# Patient Record
Sex: FEMALE | URBAN - METROPOLITAN AREA
[De-identification: names, ages, dates, MRNs, and addresses within clinical notes are randomized per-mention and may not be internally consistent; named-entity substitution may affect disease eponyms.]

---

## 2023-12-08 ENCOUNTER — NURSING HOME VISIT (OUTPATIENT)
Dept: POST ACUTE CARE | Facility: EXTERNAL LOCATION | Age: 88
End: 2023-12-08
Payer: MEDICARE

## 2023-12-08 DIAGNOSIS — E78.5 HYPERLIPIDEMIA, UNSPECIFIED HYPERLIPIDEMIA TYPE: ICD-10-CM

## 2023-12-08 DIAGNOSIS — E53.8 B12 DEFICIENCY: ICD-10-CM

## 2023-12-08 DIAGNOSIS — I10 PRIMARY HYPERTENSION: Primary | ICD-10-CM

## 2023-12-08 DIAGNOSIS — R52 PAIN: ICD-10-CM

## 2023-12-08 PROCEDURE — 99348 HOME/RES VST EST LOW MDM 30: CPT | Performed by: NURSE PRACTITIONER

## 2023-12-08 NOTE — LETTER
Patient: Aisha Harrison  : 3/26/1926    Encounter Date: 2023    Patient ID: Aisha Harrison is a 89 y.o. female who is assisted living/ home patient being seen and evaluated for multiple medical problems.  [id number]   3/26/1926    Pulse 79   Resp 15   Wt 90.3 kg (199 lb)     Assessment/Plan  Problem List Items Addressed This Visit       Pain     Hands- arthritis  Acetaminophen 650 mg by mouth simeon 8 hrs as needed         B12 deficiency     B12 1000 mcg by mouth daily   2023 B12- 716  B12 level and CBC with diff every 6 months          Primary hypertension - Primary     Amlodipine 5 mg by mouth daily   2023 BUN/Creat 17/0.77, Potassium 4.0  BMP every 6 months          Hyperlipidemia     Atorvastatin 10 mg by mouth every HS   Lipid panel every 6 months               HPI: Client resides at [facility]. PMH includes HTN, HLD, B12 deficiency, Thyroid nodules, Arthritis, Hysterectomy, and Dizziness. Client denies HA, dizziness, or lightheadedness. Denies CP, SOB, Cough, or increased edema. RA. Denies abdominal pain, N/V, diarrhea, or constipation. Denies dysuria. Denies change in appetite. Denies insomnia. C/O bilateral hand pain that she attributes to arthritis.     Review of Systems ROS as described in in HPI     Physical Exam  Constitutional:       Comments: Sitting in chair    HENT:      Mouth/Throat:      Mouth: Mucous membranes are moist.   Cardiovascular:      Rate and Rhythm: Normal rate.   Pulmonary:      Effort: Pulmonary effort is normal.      Comments: RA  Abdominal:      General: Bowel sounds are normal.   Genitourinary:     Comments: Denies dysuria   Musculoskeletal:      Cervical back: Neck supple.      Right lower leg: Right lower leg edema: plus one.      Left lower leg: Left lower leg edema: plus one.   Skin:     General: Skin is warm and dry.   Neurological:      Mental Status: She is alert. Mental status is at baseline.   Psychiatric:         Mood and Affect: Mood  normal.      Comments: Conversational   Pleasant                    Electronically Signed By: HELLEN Beckham   12/9/23  6:55 AM

## 2023-12-09 VITALS — HEART RATE: 79 BPM | WEIGHT: 199 LBS | RESPIRATION RATE: 15 BRPM

## 2023-12-09 PROBLEM — E78.5 HYPERLIPIDEMIA: Status: ACTIVE | Noted: 2023-12-09

## 2023-12-09 PROBLEM — R52 PAIN: Status: ACTIVE | Noted: 2023-12-09

## 2023-12-09 PROBLEM — E53.8 B12 DEFICIENCY: Status: ACTIVE | Noted: 2023-12-09

## 2023-12-09 PROBLEM — I10 PRIMARY HYPERTENSION: Status: ACTIVE | Noted: 2023-12-09

## 2023-12-09 NOTE — PROGRESS NOTES
Patient ID: Aisha Harrison is a 89 y.o. female who is assisted living/ home patient being seen and evaluated for multiple medical problems.  [id number]   3/26/1926    Pulse 79   Resp 15   Wt 90.3 kg (199 lb)     Assessment/Plan  Problem List Items Addressed This Visit       Pain     Hands- arthritis  Acetaminophen 650 mg by mouth simeon 8 hrs as needed         B12 deficiency     B12 1000 mcg by mouth daily   5/31/2023 B12- 716  B12 level and CBC with diff every 6 months          Primary hypertension - Primary     Amlodipine 5 mg by mouth daily   5/31/2023 BUN/Creat 17/0.77, Potassium 4.0  BMP every 6 months          Hyperlipidemia     Atorvastatin 10 mg by mouth every HS   Lipid panel every 6 months               HPI: Client resides at [facility]. PMH includes HTN, HLD, B12 deficiency, Thyroid nodules, Arthritis, Hysterectomy, and Dizziness. Client denies HA, dizziness, or lightheadedness. Denies CP, SOB, Cough, or increased edema. RA. Denies abdominal pain, N/V, diarrhea, or constipation. Denies dysuria. Denies change in appetite. Denies insomnia. C/O bilateral hand pain that she attributes to arthritis.     Review of Systems ROS as described in in HPI     Physical Exam  Constitutional:       Comments: Sitting in chair    HENT:      Mouth/Throat:      Mouth: Mucous membranes are moist.   Cardiovascular:      Rate and Rhythm: Normal rate.   Pulmonary:      Effort: Pulmonary effort is normal.      Comments: RA  Abdominal:      General: Bowel sounds are normal.   Genitourinary:     Comments: Denies dysuria   Musculoskeletal:      Cervical back: Neck supple.      Right lower leg: Right lower leg edema: plus one.      Left lower leg: Left lower leg edema: plus one.   Skin:     General: Skin is warm and dry.   Neurological:      Mental Status: She is alert. Mental status is at baseline.   Psychiatric:         Mood and Affect: Mood normal.      Comments: Conversational   Pleasant

## 2024-01-07 ENCOUNTER — NURSING HOME VISIT (OUTPATIENT)
Dept: POST ACUTE CARE | Facility: EXTERNAL LOCATION | Age: 89
End: 2024-01-07
Payer: MEDICARE

## 2024-01-07 DIAGNOSIS — E78.5 HYPERLIPIDEMIA, UNSPECIFIED HYPERLIPIDEMIA TYPE: ICD-10-CM

## 2024-01-07 DIAGNOSIS — R41.3 AMNESIA: Primary | ICD-10-CM

## 2024-01-07 DIAGNOSIS — Z91.81 AT RISK FOR FALLS: ICD-10-CM

## 2024-01-07 DIAGNOSIS — I10 PRIMARY HYPERTENSION: ICD-10-CM

## 2024-01-07 DIAGNOSIS — R53.1 WEAKNESS: ICD-10-CM

## 2024-01-07 DIAGNOSIS — M19.90 OSTEOARTHRITIS, UNSPECIFIED OSTEOARTHRITIS TYPE, UNSPECIFIED SITE: ICD-10-CM

## 2024-01-07 DIAGNOSIS — E53.8 B12 DEFICIENCY: ICD-10-CM

## 2024-01-07 PROCEDURE — 99308 SBSQ NF CARE LOW MDM 20: CPT | Performed by: INTERNAL MEDICINE

## 2024-01-07 NOTE — LETTER
Patient: Aisha Harrison  : 3/26/1926    Encounter Date: 2024    PLACE OF SERVICE:  [facility].    This is a subsequent visit.    Subjective  Patient ID: Aisha Harrison is a 89 y.o. female who presents for Follow-up.    Aisha Harrison is a 89-year-old female with history of amnesia.  She suffers from hypertension as well as osteoarthritis.  She is unable to care for herself and requires supportive care.    ALLERGIES:  Prednisone.    Review of Systems   Constitutional:  Negative for chills and fever.   Cardiovascular:  Negative for chest pain.   All other systems reviewed and are negative.    Objective  /80   Pulse 78   Temp 36.5 °C (97.7 °F)   Resp 16     Physical Exam  Vitals reviewed.   Constitutional:       General: She is not in acute distress.     Comments: This is a well-developed, well-nourished female, sitting in a chair.   HENT:      Right Ear: Tympanic membrane, ear canal and external ear normal.      Left Ear: Tympanic membrane, ear canal and external ear normal.   Eyes:      General: No scleral icterus.     Pupils: Pupils are equal, round, and reactive to light.   Neck:      Vascular: No carotid bruit.   Cardiovascular:      Heart sounds: Normal heart sounds, S1 normal and S2 normal. No murmur heard.     No friction rub.   Pulmonary:      Effort: Pulmonary effort is normal.      Breath sounds: Normal breath sounds and air entry.   Abdominal:      Palpations: There is no hepatomegaly, splenomegaly or mass.   Musculoskeletal:         General: No swelling or deformity. Normal range of motion.      Cervical back: Neck supple.      Right lower leg: No edema.      Left lower leg: No edema.   Lymphadenopathy:      Cervical: No cervical adenopathy.      Upper Body:      Right upper body: No axillary adenopathy.      Left upper body: No axillary adenopathy.      Lower Body: No right inguinal adenopathy. No left inguinal adenopathy.   Neurological:      Mental Status: She is alert.       Cranial Nerves: Cranial nerves 2-12 are intact. No cranial nerve deficit.      Sensory: No sensory deficit.      Motor: Motor function is intact. No weakness.      Gait: Gait is intact.      Deep Tendon Reflexes: Reflexes normal.      Comments: The patient is oriented x2.   Psychiatric:         Mood and Affect: Mood normal. Mood is not anxious or depressed. Affect is not angry.         Behavior: Behavior is not agitated.         Thought Content: Thought content normal.         Judgment: Judgment normal.     LAB WORK:  Laboratory studies reviewed.    Assessment/Plan  Problem List Items Addressed This Visit             ICD-10-CM       Cardiac and Vasculature    Primary hypertension I10    Hyperlipidemia E78.5       Endocrine/Metabolic    B12 deficiency E53.8     Other Visit Diagnoses         Codes    Amnesia    -  Primary R41.3    Osteoarthritis, unspecified osteoarthritis type, unspecified site     M19.90    Weakness     R53.1    At risk for falls     Z91.81        1. Amnesia, supportive care.  2. Osteoarthritis, on Tylenol.  3. Hypertension, med controlled.  4. Hyperlipidemia, on atorvastatin.  5. Vitamin B12 deficiency, on supplementation.  6. Weakness, on PT and OT.  7. Fall risk, fall precaution.    Scribe Attestation  By signing my name below, IDiana, Devi attest that this documentation has been prepared under the direction and in the presence of Nico Blackwell MD.       Electronically Signed By: Nico Blackwell MD   1/16/24  2:14 PM

## 2024-01-16 VITALS
DIASTOLIC BLOOD PRESSURE: 80 MMHG | HEART RATE: 78 BPM | SYSTOLIC BLOOD PRESSURE: 124 MMHG | TEMPERATURE: 97.7 F | RESPIRATION RATE: 16 BRPM

## 2024-01-16 ASSESSMENT — ENCOUNTER SYMPTOMS
CHILLS: 0
FEVER: 0

## 2024-01-16 NOTE — PROGRESS NOTES
PLACE OF SERVICE:  [facility].    This is a subsequent visit.    Subjective   Patient ID: Aisha Harrison is a 89 y.o. female who presents for Follow-up.    Aisha Harrison is a 89-year-old female with history of amnesia.  She suffers from hypertension as well as osteoarthritis.  She is unable to care for herself and requires supportive care.    ALLERGIES:  Prednisone.    Review of Systems   Constitutional:  Negative for chills and fever.   Cardiovascular:  Negative for chest pain.   All other systems reviewed and are negative.    Objective   /80   Pulse 78   Temp 36.5 °C (97.7 °F)   Resp 16     Physical Exam  Vitals reviewed.   Constitutional:       General: She is not in acute distress.     Comments: This is a well-developed, well-nourished female, sitting in a chair.   HENT:      Right Ear: Tympanic membrane, ear canal and external ear normal.      Left Ear: Tympanic membrane, ear canal and external ear normal.   Eyes:      General: No scleral icterus.     Pupils: Pupils are equal, round, and reactive to light.   Neck:      Vascular: No carotid bruit.   Cardiovascular:      Heart sounds: Normal heart sounds, S1 normal and S2 normal. No murmur heard.     No friction rub.   Pulmonary:      Effort: Pulmonary effort is normal.      Breath sounds: Normal breath sounds and air entry.   Abdominal:      Palpations: There is no hepatomegaly, splenomegaly or mass.   Musculoskeletal:         General: No swelling or deformity. Normal range of motion.      Cervical back: Neck supple.      Right lower leg: No edema.      Left lower leg: No edema.   Lymphadenopathy:      Cervical: No cervical adenopathy.      Upper Body:      Right upper body: No axillary adenopathy.      Left upper body: No axillary adenopathy.      Lower Body: No right inguinal adenopathy. No left inguinal adenopathy.   Neurological:      Mental Status: She is alert.      Cranial Nerves: Cranial nerves 2-12 are intact. No cranial nerve  deficit.      Sensory: No sensory deficit.      Motor: Motor function is intact. No weakness.      Gait: Gait is intact.      Deep Tendon Reflexes: Reflexes normal.      Comments: The patient is oriented x2.   Psychiatric:         Mood and Affect: Mood normal. Mood is not anxious or depressed. Affect is not angry.         Behavior: Behavior is not agitated.         Thought Content: Thought content normal.         Judgment: Judgment normal.     LAB WORK:  Laboratory studies reviewed.    Assessment/Plan   Problem List Items Addressed This Visit             ICD-10-CM       Cardiac and Vasculature    Primary hypertension I10    Hyperlipidemia E78.5       Endocrine/Metabolic    B12 deficiency E53.8     Other Visit Diagnoses         Codes    Amnesia    -  Primary R41.3    Osteoarthritis, unspecified osteoarthritis type, unspecified site     M19.90    Weakness     R53.1    At risk for falls     Z91.81        1. Amnesia, supportive care.  2. Osteoarthritis, on Tylenol.  3. Hypertension, med controlled.  4. Hyperlipidemia, on atorvastatin.  5. Vitamin B12 deficiency, on supplementation.  6. Weakness, on PT and OT.  7. Fall risk, fall precaution.    Scribe Attestation  By signing my name below, I, Diana Blackman, Scribe attest that this documentation has been prepared under the direction and in the presence of Nico Blackwell MD.

## 2024-04-01 ENCOUNTER — NURSING HOME VISIT (OUTPATIENT)
Dept: POST ACUTE CARE | Facility: EXTERNAL LOCATION | Age: 89
End: 2024-04-01
Payer: MEDICARE

## 2024-04-01 DIAGNOSIS — R53.1 WEAKNESS: ICD-10-CM

## 2024-04-01 DIAGNOSIS — R41.3 AMNESIA: Primary | ICD-10-CM

## 2024-04-01 DIAGNOSIS — I10 PRIMARY HYPERTENSION: ICD-10-CM

## 2024-04-01 DIAGNOSIS — E78.5 HYPERLIPIDEMIA, UNSPECIFIED HYPERLIPIDEMIA TYPE: ICD-10-CM

## 2024-04-01 DIAGNOSIS — M19.90 OSTEOARTHRITIS, UNSPECIFIED OSTEOARTHRITIS TYPE, UNSPECIFIED SITE: ICD-10-CM

## 2024-04-01 DIAGNOSIS — R42 DIZZINESS: ICD-10-CM

## 2024-04-01 DIAGNOSIS — Z91.81 AT RISK FOR FALLS: ICD-10-CM

## 2024-04-01 PROCEDURE — 99308 SBSQ NF CARE LOW MDM 20: CPT | Performed by: INTERNAL MEDICINE

## 2024-04-01 NOTE — LETTER
Patient: Aisha Harrison  : 3/26/1926    Encounter Date: 2024    PLACE OF SERVICE:  [facility]    This is a subsequent visit.    Subjective  Patient ID: Aisha Harrison is a 89 y.o. female who presents for Follow-up.    Aisha Harrison is a 89-year-old female who suffers from amnesia.  She has episodes of dizziness and is unable to care for herself.  She requires supportive care.    Review of Systems   Constitutional:  Negative for chills and fever.   Cardiovascular:  Negative for chest pain.   All other systems reviewed and are negative.    Objective  /82   Pulse 78   Temp 36.6 °C (97.8 °F)   Resp 18     Physical Exam  Vitals reviewed.   Constitutional:       General: She is not in acute distress.     Comments: This is a well-developed, well-nourished female, sitting in a chair   HENT:      Right Ear: Tympanic membrane, ear canal and external ear normal.      Left Ear: Tympanic membrane, ear canal and external ear normal.   Eyes:      General: No scleral icterus.     Pupils: Pupils are equal, round, and reactive to light.   Neck:      Vascular: No carotid bruit.   Cardiovascular:      Heart sounds: Normal heart sounds, S1 normal and S2 normal. No murmur heard.     No friction rub.   Pulmonary:      Effort: Pulmonary effort is normal.      Breath sounds: Normal breath sounds and air entry.   Abdominal:      Palpations: There is no hepatomegaly, splenomegaly or mass.   Musculoskeletal:         General: No swelling or deformity. Normal range of motion.      Cervical back: Neck supple.      Right lower leg: No edema.      Left lower leg: No edema.   Lymphadenopathy:      Cervical: No cervical adenopathy.      Upper Body:      Right upper body: No axillary adenopathy.      Left upper body: No axillary adenopathy.      Lower Body: No right inguinal adenopathy. No left inguinal adenopathy.   Neurological:      Cranial Nerves: Cranial nerves 2-12 are intact. No cranial nerve deficit.      Sensory: No  sensory deficit.      Motor: Motor function is intact. No weakness.      Gait: Gait is intact.      Deep Tendon Reflexes: Reflexes normal.      Comments: The patient is alert and oriented x2.   Psychiatric:         Mood and Affect: Mood normal. Mood is not anxious or depressed. Affect is not angry.         Behavior: Behavior is not agitated.         Thought Content: Thought content normal.         Judgment: Judgment normal.     LAB WORK:  Laboratory studies reviewed.    Assessment/Plan  Problem List Items Addressed This Visit             ICD-10-CM       Cardiac and Vasculature    Primary hypertension I10    Hyperlipidemia E78.5     Other Visit Diagnoses         Codes    Amnesia    -  Primary R41.3    Osteoarthritis, unspecified osteoarthritis type, unspecified site     M19.90    Weakness     R53.1    At risk for falls     Z91.81    Dizziness     R42        1. Amnesia, supportive care.  2. Hypertension, med controlled.  3. Hyperlipidemia, on statin.  4. Arthritis, on medication.  5. Dizziness, continue to monitor.  6. Weakness, on PT and OT.  7. Fall risk, fall precaution.    Scribe Attestation  By signing my name below, ISofie Scribe attest that this documentation has been prepared under the direction and in the presence of Nico Blackwell MD. All medical record entries made by the scribe were personally dictated by me I have reviewed the chart and agree the record accurately reflects my personal performance of his history physical examination and management      Electronically Signed By: Nico Blackwell MD   4/16/24  1:50 AM

## 2024-04-15 VITALS
RESPIRATION RATE: 18 BRPM | SYSTOLIC BLOOD PRESSURE: 124 MMHG | TEMPERATURE: 97.8 F | HEART RATE: 78 BPM | DIASTOLIC BLOOD PRESSURE: 82 MMHG

## 2024-04-15 ASSESSMENT — ENCOUNTER SYMPTOMS
FEVER: 0
CHILLS: 0

## 2024-04-15 NOTE — PROGRESS NOTES
PLACE OF SERVICE:  [facility]    This is a subsequent visit.    Subjective   Patient ID: Aisha Harrison is a 89 y.o. female who presents for Follow-up.    Aisha Harrison is a 89-year-old female who suffers from amnesia.  She has episodes of dizziness and is unable to care for herself.  She requires supportive care.    Review of Systems   Constitutional:  Negative for chills and fever.   Cardiovascular:  Negative for chest pain.   All other systems reviewed and are negative.    Objective   /82   Pulse 78   Temp 36.6 °C (97.8 °F)   Resp 18     Physical Exam  Vitals reviewed.   Constitutional:       General: She is not in acute distress.     Comments: This is a well-developed, well-nourished female, sitting in a chair   HENT:      Right Ear: Tympanic membrane, ear canal and external ear normal.      Left Ear: Tympanic membrane, ear canal and external ear normal.   Eyes:      General: No scleral icterus.     Pupils: Pupils are equal, round, and reactive to light.   Neck:      Vascular: No carotid bruit.   Cardiovascular:      Heart sounds: Normal heart sounds, S1 normal and S2 normal. No murmur heard.     No friction rub.   Pulmonary:      Effort: Pulmonary effort is normal.      Breath sounds: Normal breath sounds and air entry.   Abdominal:      Palpations: There is no hepatomegaly, splenomegaly or mass.   Musculoskeletal:         General: No swelling or deformity. Normal range of motion.      Cervical back: Neck supple.      Right lower leg: No edema.      Left lower leg: No edema.   Lymphadenopathy:      Cervical: No cervical adenopathy.      Upper Body:      Right upper body: No axillary adenopathy.      Left upper body: No axillary adenopathy.      Lower Body: No right inguinal adenopathy. No left inguinal adenopathy.   Neurological:      Cranial Nerves: Cranial nerves 2-12 are intact. No cranial nerve deficit.      Sensory: No sensory deficit.      Motor: Motor function is intact. No weakness.       Gait: Gait is intact.      Deep Tendon Reflexes: Reflexes normal.      Comments: The patient is alert and oriented x2.   Psychiatric:         Mood and Affect: Mood normal. Mood is not anxious or depressed. Affect is not angry.         Behavior: Behavior is not agitated.         Thought Content: Thought content normal.         Judgment: Judgment normal.     LAB WORK:  Laboratory studies reviewed.    Assessment/Plan   Problem List Items Addressed This Visit             ICD-10-CM       Cardiac and Vasculature    Primary hypertension I10    Hyperlipidemia E78.5     Other Visit Diagnoses         Codes    Amnesia    -  Primary R41.3    Osteoarthritis, unspecified osteoarthritis type, unspecified site     M19.90    Weakness     R53.1    At risk for falls     Z91.81    Dizziness     R42        1. Amnesia, supportive care.  2. Hypertension, med controlled.  3. Hyperlipidemia, on statin.  4. Arthritis, on medication.  5. Dizziness, continue to monitor.  6. Weakness, on PT and OT.  7. Fall risk, fall precaution.    Scribe Attestation  By signing my name below, ISofie Scribe attest that this documentation has been prepared under the direction and in the presence of iNco Blackwell MD. All medical record entries made by the scribe were personally dictated by me I have reviewed the chart and agree the record accurately reflects my personal performance of his history physical examination and management

## 2024-04-19 ENCOUNTER — NURSING HOME VISIT (OUTPATIENT)
Dept: POST ACUTE CARE | Facility: EXTERNAL LOCATION | Age: 89
End: 2024-04-19
Payer: MEDICARE

## 2024-04-19 DIAGNOSIS — E78.5 HYPERLIPIDEMIA, UNSPECIFIED HYPERLIPIDEMIA TYPE: ICD-10-CM

## 2024-04-19 DIAGNOSIS — E53.8 B12 DEFICIENCY: ICD-10-CM

## 2024-04-19 DIAGNOSIS — R52 PAIN: ICD-10-CM

## 2024-04-19 DIAGNOSIS — I10 PRIMARY HYPERTENSION: Primary | ICD-10-CM

## 2024-04-19 PROCEDURE — 99348 HOME/RES VST EST LOW MDM 30: CPT | Performed by: NURSE PRACTITIONER

## 2024-04-19 NOTE — LETTER
Patient: Aisha Harrison  : 3/26/1926    Encounter Date: 2024    Patient ID: Aisha Harrison is a 89 y.o. female who is assisted living/ home patient being seen and evaluated for multiple medical problems.  [id number]   3/26/1926    Pulse 78   Resp 15   Wt 90.7 kg (200 lb)     Assessment/Plan  Problem List Items Addressed This Visit       Pain     Hands- arthritis  Acetaminophen 650 mg by mouth every 8 hrs as needed         B12 deficiency     B12 1000 mcg by mouth daily   2023 B12- 895  2023 H/H 11.6/35.5         Primary hypertension - Primary     Amlodipine 5 mg by mouth daily   2023 BUN/Creat 17/0.65, Potassium 3.9         Hyperlipidemia     Atorvastatin 10 mg by mouth every HS   2023 C 139, T 89, HDL 46, LDL 75              HPI: Client resides in Assisted Living at [facility]. Client denies HA, dizziness, or lightheadedness. Denies CP, SOB, Cough, or increased edema. RA. Denies abdominal pain, N/V, diarrhea, or constipation. Denies dysuria. Denies change in appetite. She C/O chronic joint pain that she attributes to arthritis.     Review of Systems ROS as described in HPI     Physical Exam  Constitutional:       Comments: Sitting in chair    HENT:      Mouth/Throat:      Mouth: Mucous membranes are moist.   Cardiovascular:      Rate and Rhythm: Normal rate.      Comments: Occasional  premature beat   Pulmonary:      Effort: Pulmonary effort is normal.      Breath sounds: Normal breath sounds.      Comments: RA  Abdominal:      General: Bowel sounds are normal.   Genitourinary:     Comments: Denies dysuria   Musculoskeletal:      Comments: Rolator   No leg edema    Skin:     General: Skin is warm and dry.   Neurological:      Mental Status: She is alert. Mental status is at baseline.   Psychiatric:         Mood and Affect: Mood normal.      Comments: Conversational                    Electronically Signed By: HELLEN Beckham   24 10:24 AM

## 2024-04-21 VITALS — RESPIRATION RATE: 15 BRPM | WEIGHT: 200 LBS | HEART RATE: 78 BPM

## 2024-04-21 NOTE — PROGRESS NOTES
Patient ID: Aisha Harrison is a 89 y.o. female who is assisted living/ home patient being seen and evaluated for multiple medical problems.  [id number]   3/26/1926    Pulse 78   Resp 15   Wt 90.7 kg (200 lb)     Assessment/Plan  Problem List Items Addressed This Visit       Pain     Hands- arthritis  Acetaminophen 650 mg by mouth every 8 hrs as needed         B12 deficiency     B12 1000 mcg by mouth daily   12/11/2023 B12- 895  12/11/2023 H/H 11.6/35.5         Primary hypertension - Primary     Amlodipine 5 mg by mouth daily   12/11/2023 BUN/Creat 17/0.65, Potassium 3.9         Hyperlipidemia     Atorvastatin 10 mg by mouth every HS   12/11/2023 C 139, T 89, HDL 46, LDL 75              HPI: Client resides in Assisted Living at [facility]. Client denies HA, dizziness, or lightheadedness. Denies CP, SOB, Cough, or increased edema. RA. Denies abdominal pain, N/V, diarrhea, or constipation. Denies dysuria. Denies change in appetite. She C/O chronic joint pain that she attributes to arthritis.     Review of Systems ROS as described in HPI     Physical Exam  Constitutional:       Comments: Sitting in chair    HENT:      Mouth/Throat:      Mouth: Mucous membranes are moist.   Cardiovascular:      Rate and Rhythm: Normal rate.      Comments: Occasional  premature beat   Pulmonary:      Effort: Pulmonary effort is normal.      Breath sounds: Normal breath sounds.      Comments: RA  Abdominal:      General: Bowel sounds are normal.   Genitourinary:     Comments: Denies dysuria   Musculoskeletal:      Comments: Rolator   No leg edema    Skin:     General: Skin is warm and dry.   Neurological:      Mental Status: She is alert. Mental status is at baseline.   Psychiatric:         Mood and Affect: Mood normal.      Comments: Conversational

## 2024-07-07 ENCOUNTER — NURSING HOME VISIT (OUTPATIENT)
Dept: POST ACUTE CARE | Facility: EXTERNAL LOCATION | Age: 89
End: 2024-07-07
Payer: MEDICARE

## 2024-07-07 DIAGNOSIS — Z91.81 AT RISK FOR FALLS: ICD-10-CM

## 2024-07-07 DIAGNOSIS — I10 PRIMARY HYPERTENSION: ICD-10-CM

## 2024-07-07 DIAGNOSIS — R41.3 AMNESIA: Primary | ICD-10-CM

## 2024-07-07 DIAGNOSIS — M19.90 OSTEOARTHRITIS, UNSPECIFIED OSTEOARTHRITIS TYPE, UNSPECIFIED SITE: ICD-10-CM

## 2024-07-07 DIAGNOSIS — R42 DIZZINESS: ICD-10-CM

## 2024-07-07 DIAGNOSIS — R53.1 WEAKNESS: ICD-10-CM

## 2024-07-07 DIAGNOSIS — E78.5 HYPERLIPIDEMIA, UNSPECIFIED HYPERLIPIDEMIA TYPE: ICD-10-CM

## 2024-07-07 PROCEDURE — 99308 SBSQ NF CARE LOW MDM 20: CPT | Performed by: INTERNAL MEDICINE

## 2024-07-07 NOTE — LETTER
Patient: Aisha Harrison  : 3/26/1926    Encounter Date: 2024    PLACE OF SERVICE:  [facility].    This is a subsequent visit.    Subjective  Patient ID: Aisha Harrison is a 89 y.o. female who presents for Follow-up.    Ms. Aisha Harrison is a 89-year-old female who suffers from amnesia.  She is at fall risk and unable to care for herself.  She requires supportive care.    Review of Systems   Constitutional:  Negative for chills and fever.   Cardiovascular:  Negative for chest pain.   All other systems reviewed and are negative.    Objective  /76   Pulse 74   Temp 36.5 °C (97.7 °F)   Resp 16     Physical Exam  Vitals reviewed.   Constitutional:       General: She is not in acute distress.     Comments: This is a well-developed, well-nourished female, sitting in a chair.   HENT:      Right Ear: Tympanic membrane, ear canal and external ear normal.      Left Ear: Tympanic membrane, ear canal and external ear normal.   Eyes:      General: No scleral icterus.     Pupils: Pupils are equal, round, and reactive to light.   Neck:      Vascular: No carotid bruit.   Cardiovascular:      Heart sounds: Normal heart sounds, S1 normal and S2 normal. No murmur heard.     No friction rub.   Pulmonary:      Effort: Pulmonary effort is normal.      Breath sounds: Normal breath sounds and air entry.   Abdominal:      Palpations: There is no hepatomegaly, splenomegaly or mass.   Musculoskeletal:         General: No swelling or deformity. Normal range of motion.      Cervical back: Neck supple.      Right lower leg: No edema.      Left lower leg: No edema.   Lymphadenopathy:      Cervical: No cervical adenopathy.      Upper Body:      Right upper body: No axillary adenopathy.      Left upper body: No axillary adenopathy.      Lower Body: No right inguinal adenopathy. No left inguinal adenopathy.   Neurological:      Mental Status: She is oriented to person, place, and time.      Cranial Nerves: Cranial nerves 2-12  are intact. No cranial nerve deficit.      Sensory: No sensory deficit.      Motor: Motor function is intact. No weakness.      Gait: Gait is intact.      Deep Tendon Reflexes: Reflexes normal.   Psychiatric:         Mood and Affect: Mood normal. Mood is not anxious or depressed. Affect is not angry.         Behavior: Behavior is not agitated.         Thought Content: Thought content normal.         Judgment: Judgment normal.     LAB WORK:  Laboratory studies reviewed.    Assessment/Plan  Problem List Items Addressed This Visit             ICD-10-CM       Cardiac and Vasculature    Primary hypertension I10    Hyperlipidemia E78.5     Other Visit Diagnoses         Codes    Amnesia    -  Primary R41.3    Weakness     R53.1    At risk for falls     Z91.81    Osteoarthritis, unspecified osteoarthritis type, unspecified site     M19.90    Dizziness     R42        1. Amnesia, on supportive care.  2. Hyperlipidemia, on statin.  3. Hypertension, med controlled.  4. Weakness, on PT and OT.  5. Fall risk, fall precaution.  6. Osteoarthritis, on Tylenol.  7. Recent dizziness, continue to monitor.    Scribe Attestation  By signing my name below, I, Devi Pierre attest that this documentation has been prepared under the direction and in the presence of Nico Blackwell MD.     All medical record entries made by the scribe were personally dictated by me I have reviewed the chart and agree the record accurately reflects my personal performance of his history physical examination and management      Electronically Signed By: Nico Blackwell MD   7/11/24 10:44 PM

## 2024-07-09 VITALS
TEMPERATURE: 97.7 F | HEART RATE: 74 BPM | DIASTOLIC BLOOD PRESSURE: 76 MMHG | RESPIRATION RATE: 16 BRPM | SYSTOLIC BLOOD PRESSURE: 126 MMHG

## 2024-07-09 ASSESSMENT — ENCOUNTER SYMPTOMS
CHILLS: 0
FEVER: 0

## 2024-07-09 NOTE — PROGRESS NOTES
PLACE OF SERVICE:  [facility].    This is a subsequent visit.    Subjective   Patient ID: Aisha Harrison is a 89 y.o. female who presents for Follow-up.    Ms. Aisha Harrison is a 89-year-old female who suffers from amnesia.  She is at fall risk and unable to care for herself.  She requires supportive care.    Review of Systems   Constitutional:  Negative for chills and fever.   Cardiovascular:  Negative for chest pain.   All other systems reviewed and are negative.    Objective   /76   Pulse 74   Temp 36.5 °C (97.7 °F)   Resp 16     Physical Exam  Vitals reviewed.   Constitutional:       General: She is not in acute distress.     Comments: This is a well-developed, well-nourished female, sitting in a chair.   HENT:      Right Ear: Tympanic membrane, ear canal and external ear normal.      Left Ear: Tympanic membrane, ear canal and external ear normal.   Eyes:      General: No scleral icterus.     Pupils: Pupils are equal, round, and reactive to light.   Neck:      Vascular: No carotid bruit.   Cardiovascular:      Heart sounds: Normal heart sounds, S1 normal and S2 normal. No murmur heard.     No friction rub.   Pulmonary:      Effort: Pulmonary effort is normal.      Breath sounds: Normal breath sounds and air entry.   Abdominal:      Palpations: There is no hepatomegaly, splenomegaly or mass.   Musculoskeletal:         General: No swelling or deformity. Normal range of motion.      Cervical back: Neck supple.      Right lower leg: No edema.      Left lower leg: No edema.   Lymphadenopathy:      Cervical: No cervical adenopathy.      Upper Body:      Right upper body: No axillary adenopathy.      Left upper body: No axillary adenopathy.      Lower Body: No right inguinal adenopathy. No left inguinal adenopathy.   Neurological:      Mental Status: She is oriented to person, place, and time.      Cranial Nerves: Cranial nerves 2-12 are intact. No cranial nerve deficit.      Sensory: No sensory  deficit.      Motor: Motor function is intact. No weakness.      Gait: Gait is intact.      Deep Tendon Reflexes: Reflexes normal.   Psychiatric:         Mood and Affect: Mood normal. Mood is not anxious or depressed. Affect is not angry.         Behavior: Behavior is not agitated.         Thought Content: Thought content normal.         Judgment: Judgment normal.     LAB WORK:  Laboratory studies reviewed.    Assessment/Plan   Problem List Items Addressed This Visit             ICD-10-CM       Cardiac and Vasculature    Primary hypertension I10    Hyperlipidemia E78.5     Other Visit Diagnoses         Codes    Amnesia    -  Primary R41.3    Weakness     R53.1    At risk for falls     Z91.81    Osteoarthritis, unspecified osteoarthritis type, unspecified site     M19.90    Dizziness     R42        1. Amnesia, on supportive care.  2. Hyperlipidemia, on statin.  3. Hypertension, med controlled.  4. Weakness, on PT and OT.  5. Fall risk, fall precaution.  6. Osteoarthritis, on Tylenol.  7. Recent dizziness, continue to monitor.    Scribe Attestation  By signing my name below, I, Devi Pierre attest that this documentation has been prepared under the direction and in the presence of Nico Blackwell MD.     All medical record entries made by the scribe were personally dictated by me I have reviewed the chart and agree the record accurately reflects my personal performance of his history physical examination and management